# Patient Record
(demographics unavailable — no encounter records)

---

## 2025-05-02 NOTE — HEALTH RISK ASSESSMENT
[Yes] : Yes [2 - 3 times a week (3 pts)] : 2 - 3  times a week (3 points) [3 or 4 (1 pt)] : 3 or 4  (1 point) [Never (0 pts)] : Never (0 points) [No] : In the past 12 months have you used drugs other than those required for medical reasons? No [Intercurrent Urgi Care visits] : went to urgent care [No falls in past year] : Patient reported no falls in the past year [1] : 2) Feeling down, depressed, or hopeless for several days (1) [de-identified] : Pneumonia   [de-identified] : no [Audit-CScore] : 4 [de-identified] : walking  [de-identified] : healthy [de-identified] : Mother passed in November  [[id number]] : 2

## 2025-05-02 NOTE — HISTORY OF PRESENT ILLNESS
[FreeTextEntry1] : follow up [de-identified] : follow up  PMHx - HTN, CAD s/p CABG 2012, HLD, CHF  htn- well controlled,  had pneumonia last year

## 2025-05-02 NOTE — ASSESSMENT
[FreeTextEntry1] : follow up  PMHx - HTN, CAD s/p CABG 2012, HLD, CHF  htn- well controlled,  had pneumonia last year  Blood work drawn in office today  CAD/CHF- needs to see cardio- referral placed